# Patient Record
Sex: MALE | Race: WHITE | URBAN - METROPOLITAN AREA
[De-identification: names, ages, dates, MRNs, and addresses within clinical notes are randomized per-mention and may not be internally consistent; named-entity substitution may affect disease eponyms.]

---

## 2018-04-23 ENCOUNTER — OFFICE VISIT (OUTPATIENT)
Dept: SURGERY | Facility: CLINIC | Age: 23
End: 2018-04-23
Attending: SURGERY
Payer: COMMERCIAL

## 2018-04-23 VITALS — WEIGHT: 89 LBS

## 2018-04-23 DIAGNOSIS — Z43.1 ATTENTION TO G-TUBE (H): ICD-10-CM

## 2018-04-23 DIAGNOSIS — G80.1 CEREBRAL PALSY WITH SPASTIC DIPLEGIA (H): Primary | ICD-10-CM

## 2018-04-23 PROCEDURE — G0463 HOSPITAL OUTPT CLINIC VISIT: HCPCS | Mod: ZF

## 2018-04-23 PROCEDURE — 99212 OFFICE O/P EST SF 10 MIN: CPT | Mod: ZP | Performed by: SURGERY

## 2018-04-23 ASSESSMENT — PAIN SCALES - GENERAL: PAINLEVEL: MODERATE PAIN (4)

## 2018-04-23 NOTE — PROGRESS NOTES
Kieran Baeza is well know to me within my [facility]. On 3/21/2018 he had an outpatient operation to close his old gastrostomy site. He had not used it for many months and it had failed to completely close. He did well for about 2 weeks, when the wound broke down with a repeat fistula. Initial treatment attempts involved a VAC, but this failed secondary to abundant gastric secretions and food particles. His Mother has been performing frequent dressing changes and using skin barrier creams.     On exam today , he is in his chair, looks well. Skin around the site is irritated and red, appears secondary to secretions and not infection, site itself is about 1 x 1.5 cm and clean with abundant granulation tissues and G-tube button is in place.      A/P Kieran continues with a leaking old g-tube site and requires frequent dressing changes. The amount secretions appears to have decreased. Wound is clean with skin irritation from secretions.    Recommend continued dressing changes, could consider trying the VAC dressing again since the track is starting to close around the tube, this may make his cares better and less painful. Other option would be to change his G-tube to a G/J tube so we can possible feed him distally again and divert his gastric secretions.    Mom would like to try the VAC dressing again a nd if it fails consider step 2.    They were given some more Ilex for skin cares and will help them coordinate the VAC and dressing supplies.

## 2018-04-23 NOTE — MR AVS SNAPSHOT
After Visit Summary   2018    Kieran Baeza    MRN: [id number]           Patient Information     Date Of Birth          1995        Visit Information        Provider Department      2018 1:30 PM Syed Arroyo MD Peds Surgery [facility] PEDIATRIC GENERAL SURGERY      Today's Diagnoses     Cerebral palsy with spastic diplegia (H)    -  1    Attention to G-tube (H)           Follow-ups after your visit        Follow-up notes from your care team     Return if symptoms worsen or fail to improve.      Who to contact     Please call your clinic at [phone] to:    Ask questions about your health    Make or cancel appointments    Discuss your medicines    Learn about your test results    Speak to your doctor            Additional Information About Your Visit        MyChart Information     INgrooveshart is an electronic gateway that provides easy, online access to your medical records. With [employer], you can request a clinic appointment, read your test results, renew a prescription or communicate with your care team.     To sign up for Critter[employer]t visit the website at [email or web address]   You will be asked to enter the access code listed below, as well as some personal information. Please follow the directions to create your username and password.     Your access code is: [email or web address]  Expires: 2018  2:39 PM     Your access code will  in 90 days. If you need help or a new code, please contact your [facility] or call [phone] for assistance.        Care EveryWhere ID     This is your Care EveryWhere ID. This could be used by other organizations to access your [facility] medical records  [id number]         Blood Pressure from Last 3 Encounters:   No data found for BP    Weight from Last 3 Encounters:   18 89 lb (40.4 kg)              Today, you had the following     No orders found for display       Primary Care Provider Office Phone # Fax #     Eldon Levine [phone]       [facility] NEUROLOGY [id number]  [facility] [address]        Equal Access to Services     SIL MORALES : Hadii aad ku hadsukhiandrew Sosteph, waaxda luqadaha, qaybta kaalmada ademilton, nas rhodesbritton gastelumkar roca john wu. So [facility] [phone].    ATENCIÓN: Si habla español, tiene a cornell disposición servicios gratuitos de asistencia lingüística. Llame al [phone].    We comply with applicable federal civil rights laws and [place] laws. We do not discriminate on the basis of race, color, national origin, age, disability, sex, sexual orientation, or gender identity.            Thank you!     Thank you for choosing PEDS SURGERY  for your care. Our goal is always to provide you with excellent care. Hearing back from our patients is one way we can continue to improve our services. Please take a few minutes to complete the written survey that you may receive in the mail after your visit with us. Thank you!             Your Updated Medication List - Protect others around you: Learn how to safely use, store and throw away your medicines at www.disposemymeds.org.          This list is accurate as of 4/23/18  2:39 PM.  Always use your most recent med list.                   Brand Name Dispense Instructions for use Diagnosis    order for DME     1 Month    Equipment being ordered:  Flexitrac tube securement device, 1 per day 5 ml slip tip or leur lock syringe, 5 syringes    Gastrostomy tube in place (H)

## 2018-04-23 NOTE — NURSING NOTE
Chief Complaint   Patient presents with     RECHECK     FOLLOW UP       Initial Wt 89 lb (40.4 kg) There is no height or weight on file to calculate BMI.  Medication Reconciliation: complete     Blake Welch LPN

## 2018-04-23 NOTE — LETTER
4/23/2018      RE: Kieran Baeza  [address] [place] [address] [place] [address]       Kieran Baeza is well know to me within my [facility]. On 3/21/2018 he had an outpatient operation to close his old gastrostomy site. He had not used it for many months and it had failed to completely close. He did well for about 2 weeks, when the wound broke down with a repeat fistula. Initial treatment attempts involved a VAC, but this failed secondary to abundant gastric secretions and food particles. His Mother has been performing frequent dressing changes and using skin barrier creams.     On exam today , he is in his chair, looks well. Skin around the site is irritated and red, appears secondary to secretions and not infection, site itself is about 1 x 1.5 cm and clean with abundant granulation tissues and G-tube button is in place.      A/P Kieran continues with a leaking old g-tube site and requires frequent dressing changes. The amount secretions appears to have decreased. Wound is clean with skin irritation from secretions.    Recommend continued dressing changes, could consider trying the VAC dressing again since the track is starting to close around the tube, this may make his cares better and less painful. Other option would be to change his G-tube to a G/J tube so we can possible feed him distally again and divert his gastric secretions.    Mom would like to try the VAC dressing again a nd if it fails consider step 2.    They were given some more Ilex for skin cares and will help them coordinate the VAC and dressing supplies.    Syed Arroyo MD

## 2018-06-27 DIAGNOSIS — K94.23 LEAKAGE OF GASTROSTOMY SITE (H): Primary | ICD-10-CM

## 2018-07-16 DIAGNOSIS — K31.6 GASTROCUTANEOUS FISTULA DUE TO GASTROSTOMY TUBE: Primary | ICD-10-CM

## 2018-07-16 NOTE — PROGRESS NOTES
Patient seen by Dr. Arroyo at [facility]. Patient requires GJ tube placement. Order entered into EPIC.

## 2018-08-09 ENCOUNTER — HOSPITAL ENCOUNTER (OUTPATIENT)
Dept: INTERVENTIONAL RADIOLOGY/VASCULAR | Facility: CLINIC | Age: 23
End: 2018-08-09
Attending: SURGERY | Admitting: SURGERY
Payer: COMMERCIAL

## 2018-08-09 ENCOUNTER — HOSPITAL ENCOUNTER (OUTPATIENT)
Facility: CLINIC | Age: 23
Discharge: HOME | End: 2018-08-09
Attending: SURGERY | Admitting: SURGERY
Payer: COMMERCIAL

## 2018-08-09 ENCOUNTER — OFFICE VISIT (OUTPATIENT)
Dept: SURGERY | Facility: CLINIC | Age: 23
End: 2018-08-09
Attending: SURGERY
Payer: COMMERCIAL

## 2018-08-09 ENCOUNTER — HOSPITAL ENCOUNTER (OUTPATIENT)
Dept: NUCLEAR MEDICINE | Facility: CLINIC | Age: 23
Setting detail: NUCLEAR MEDICINE
End: 2018-08-09
Attending: SURGERY
Payer: COMMERCIAL

## 2018-08-09 ENCOUNTER — HOSPITAL ENCOUNTER (OUTPATIENT)
Dept: GENERAL RADIOLOGY | Facility: CLINIC | Age: 23
End: 2018-08-09
Attending: SURGERY | Admitting: SURGERY
Payer: COMMERCIAL

## 2018-08-09 VITALS
RESPIRATION RATE: 18 BRPM | HEART RATE: 112 BPM | DIASTOLIC BLOOD PRESSURE: 130 MMHG | SYSTOLIC BLOOD PRESSURE: 179 MMHG | TEMPERATURE: 97.9 F | OXYGEN SATURATION: 99 %

## 2018-08-09 VITALS — HEART RATE: 110 BPM | OXYGEN SATURATION: 98 % | WEIGHT: 95 LBS

## 2018-08-09 DIAGNOSIS — K94.23 LEAKAGE OF GASTROSTOMY SITE (H): ICD-10-CM

## 2018-08-09 DIAGNOSIS — K31.6 GASTROCUTANEOUS FISTULA DUE TO GASTROSTOMY TUBE: ICD-10-CM

## 2018-08-09 DIAGNOSIS — Z43.1 ATTENTION TO G-TUBE (H): ICD-10-CM

## 2018-08-09 DIAGNOSIS — G80.1 CEREBRAL PALSY WITH SPASTIC DIPLEGIA (H): Primary | ICD-10-CM

## 2018-08-09 PROCEDURE — 25000128 H RX IP 250 OP 636: Performed by: PHYSICIAN ASSISTANT

## 2018-08-09 PROCEDURE — C1769 GUIDE WIRE: HCPCS

## 2018-08-09 PROCEDURE — A9541 TC99M SULFUR COLLOID: HCPCS | Performed by: SURGERY

## 2018-08-09 PROCEDURE — 27210732 ZZH ACCESSORY CR1

## 2018-08-09 PROCEDURE — 99213 OFFICE O/P EST LOW 20 MIN: CPT | Mod: ZP | Performed by: SURGERY

## 2018-08-09 PROCEDURE — 78264 GASTRIC EMPTYING IMG STUDY: CPT

## 2018-08-09 PROCEDURE — G0463 HOSPITAL OUTPT CLINIC VISIT: HCPCS | Mod: 25

## 2018-08-09 PROCEDURE — 49446 CHANGE G-TUBE TO G-J PERC: CPT

## 2018-08-09 PROCEDURE — C1887 CATHETER, GUIDING: HCPCS

## 2018-08-09 PROCEDURE — 25000125 ZZHC RX 250: Performed by: PHYSICIAN ASSISTANT

## 2018-08-09 PROCEDURE — 34300033 ZZH RX 343: Performed by: SURGERY

## 2018-08-09 PROCEDURE — G0463 HOSPITAL OUTPT CLINIC VISIT: HCPCS | Mod: ZF

## 2018-08-09 RX ORDER — IOPAMIDOL 612 MG/ML
15 INJECTION, SOLUTION INTRATHECAL ONCE
Status: COMPLETED | OUTPATIENT
Start: 2018-08-09 | End: 2018-08-09

## 2018-08-09 RX ADMIN — LIDOCAINE HYDROCHLORIDE 1 TUBE: 20 JELLY TOPICAL at 08:39

## 2018-08-09 RX ADMIN — Medication 2 MILLICURIE: at 09:15

## 2018-08-09 RX ADMIN — IOPAMIDOL 15 ML: 612 INJECTION, SOLUTION INTRATHECAL at 08:38

## 2018-08-09 RX ADMIN — MIDAZOLAM 7.5 MG: 5 INJECTION INTRAMUSCULAR; INTRAVENOUS at 08:01

## 2018-08-09 ASSESSMENT — PAIN SCALES - GENERAL: PAINLEVEL: NO PAIN (0)

## 2018-08-09 NOTE — MR AVS SNAPSHOT
After Visit Summary   2018    Kieran Baeza    MRN: [id number]           Patient Information     Date Of Birth          1995        Visit Information        Provider Department      2018 1:45 PM Syed Arroyo MD Peds Surgery [facility] PEDIATRIC GENERAL SURGERY      Care Instructions    We can check all his labs in a couple weeks and then look at scheduling. You left with a card today, call or email with any questions or concerns.            Follow-ups after your visit        Follow-up notes from your care team     Return in about 3 weeks (around 2018).      Who to contact     Please call your clinic at [phone] to:    Ask questions about your health    Make or cancel appointments    Discuss your medicines    Learn about your test results    Speak to your doctor            Additional Information About Your Visit        MyChart Information     [employer] is an electronic gateway that provides easy, online access to your medical records. With [employer], you can request a clinic appointment, read your test results, renew a prescription or communicate with your care team.     To sign up for [employer] visit the website at [email or web address]   You will be asked to enter the access code listed below, as well as some personal information. Please follow the directions to create your username and password.     Your access code is: [email or web address]  Expires: 2018  1:47 PM     Your access code will  in 90 days. If you need help or a new code, please contact your [facility] or call [phone] for assistance.        Care EveryWhere ID     This is your Care EveryWhere ID. This could be used by other organizations to access your [facility] medical records  [id number]         Blood Pressure from Last 3 Encounters:   18 (!) 179/130    Weight from Last 3 Encounters:   18 95 lb (43.1 kg)   18 89 lb (40.4 kg)              Today, you had the  following     No orders found for display         Today's Medication Changes      Notice     This visit is during an admission. Changes to the med list made in this visit will be reflected in the After Visit Summary of the admission.             Primary Care Provider Office Phone # Fax #    Eldon Levine [phone]       [facility] [id number]  [facility] [address]        Equal Access to Services     [facility] : Hadii aad ku hadasho Soomaali, waaxda luqadaha, qaybta kaalmada adeegyada, waxay idiin hayaan adeeg kharash la'aan . So [facility] [phone].    ATENCIÓN: Si habla español, tiene a cornell disposición servicios gratuitos de asistencia lingüística. Llame al [phone].    We comply with applicable federal civil rights laws and [place] laws. We do not discriminate on the basis of race, color, national origin, age, disability, sex, sexual orientation, or gender identity.            Thank you!     Thank you for choosing PEDS SURGERY  for your care. Our goal is always to provide you with excellent care. Hearing back from our patients is one way we can continue to improve our services. Please take a few minutes to complete the written survey that you may receive in the mail after your visit with us. Thank you!             Your Updated Medication List - Protect others around you: Learn how to safely use, store and throw away your medicines at www.disposemymeds.org.      Notice     This visit is during an admission. Changes to the med list made in this visit will be reflected in the After Visit Summary of the admission.

## 2018-08-09 NOTE — PROGRESS NOTES
2018         [facility]    [address]    [place]  [address]       PATIENT:   Kieran Baeza   :  1995   MRN #:  [id number]       Dear Dr. Levine:      It was a pleasure to see your patient, Kieran Baeza, here at the [facility] in combination with my clinic at [facility].  As you recall, Kieran is a 22-year-old male with cerebral palsy and spastic quadriparesis and has had a long-term GI access.  Earlier this spring, he underwent closure of his gastrostomy tube site.  He has not used it in several years and it did not completely close spontaneously.  Unfortunately, the wound broke down, became infected and re-fistulized.  We have tried different wound care options with a VAC dressing and a small catheter across it.  It has re-matured to a more standard gastrostomy tube site at this time and preparation staging for a ventral potential reoperation to close it.  Today, he underwent a gastric emptying study as well as had a GJ tube placed across the site.      His gastric emptying study showed that he is at the upper limits of normal.  His half time was 85 minutes, but the stomach did empty well.  The GJ tube was placed without complication.      Our working plan will be to have him have his jejunostomy feeds or we can keep the stomach decompressed to allow his wound is healed.  When he has good healing of his anterior abdominal wall and he has good nutritional parameters, mother and Kieran would like to have a repeat operation and a larger incision around his gastrostomy tube site for a 2-layer closure of his stomach.      They both are very aware of his risk of bleeding and infection and possible repeat fistulization or even peritonitis if he would leak intraabdominally.      On today's exam, we are unable to get a weight on Kieran in his chair but he is awake and alert and very talkative today.  His  abdomen is diffusely soft.  His site is nice and clean.  There is some excoriation of his skin from gastric secretions, but there is no erythema to suggest infection.      In summary, Kieran did well with the GJ tube and gastric emptying study.  We will let the parents know those results.  We will check his albumin, hemoglobin and BMP in roughly 3 weeks and if those are good, plan for operation sometime in October or possibly late September of this year.      Sincerely,            Syed Arroyo MD

## 2018-08-09 NOTE — PATIENT INSTRUCTIONS
We can check all his labs in a couple weeks and then look at scheduling. You left with a card today, call or email with any questions or concerns.

## 2018-08-09 NOTE — PROCEDURES
Interventional Radiology Brief Post Procedure Note    Procedure: IR GASTRO JEJUNOSTOMY TUBE PLACEMENT    Proceduralist: August Phillips PA-C    Assistant: Choco Guan MS4    Time Out: Prior to the start of the procedure and with procedural staff participation, I verbally confirmed the patient s identity using two indicators, relevant allergies, that the procedure was appropriate and matched the consent or emergent situation, and that the correct equipment/implants were available. Immediately prior to starting the procedure I conducted the Time Out with the procedural staff and re-confirmed the patient s name, procedure, and site/side. (The Joint Commission universal protocol was followed.)  Yes    Sedation: None. Local Anesthestic used    Findings: Completed fluoroscopy-guided replacement of new 16 Fr 45 cm GJ tube through existing tract. Tube is ready for immediate use.    Estimated Blood Loss: Minimal    Fluoroscopy Time: 2.1 minute(s)    Specimens: None    Complications: 1. None     Condition: Stable    Plan: Follow up with Dr. Metcalf to discuss plan for closure of gastrostomy. Otherwise, return in 9-12 months for routine exchange, or sooner if necessary.    Comments: See dictated procedure note for full details.    August Phillips PA-C  Interventional Radiology  [phone]

## 2018-08-09 NOTE — LETTER
2018      RE: Kieran Baeza  [address] [place] [address] [place] [address]       2018         Eldon Levine MD    [facility] Neurology    [address]    [place]  [address]       PATIENT:   Kieran Baeza   :  1995   MRN #:  [id number]       Dear Dr. Levine:      It was a pleasure to see your patient, Kieran Baeza, here at the [facility] in combination with my clinic at [facility].  As you recall, Kieran is a 22-year-old male with cerebral palsy and spastic quadriparesis and has had a long-term GI access.  Earlier this spring, he underwent closure of his gastrostomy tube site.  He has not used it in several years and it did not completely close spontaneously.  Unfortunately, the wound broke down, became infected and re-fistulized.  We have tried different wound care options with a VAC dressing and a small catheter across it.  It has re-matured to a more standard gastrostomy tube site at this time and preparation staging for a ventral potential reoperation to close it.  Today, he underwent a gastric emptying study as well as had a GJ tube placed across the site.      His gastric emptying study showed that he is at the upper limits of normal.  His half time was 85 minutes, but the stomach did empty well.  The GJ tube was placed without complication.      Our working plan will be to have him have his jejunostomy feeds or we can keep the stomach decompressed to allow his wound is healed.  When he has good healing of his anterior abdominal wall and he has good nutritional parameters, mother and Kieran would like to have a repeat operation and a larger incision around his gastrostomy tube site for a 2-layer closure of his stomach.      They both are very aware of his risk of bleeding and infection and possible repeat fistulization or even peritonitis if he would leak intraabdominally.      On today's exam, we are unable to get a  weight on Kieran in his chair but he is awake and alert and very talkative today.  His abdomen is diffusely soft.  His site is nice and clean.  There is some excoriation of his skin from gastric secretions, but there is no erythema to suggest infection.      In summary, Kieran did well with the GJ tube and gastric emptying study.  We will let the parents know those results.  We will check his albumin, hemoglobin and BMP in roughly 3 weeks and if those are good, plan for operation sometime in October or possibly late September of this year.      Sincerely,            Syed Arroyo MD

## 2018-08-27 DIAGNOSIS — K31.6 GASTROCUTANEOUS FISTULA DUE TO GASTROSTOMY TUBE: Primary | ICD-10-CM
